# Patient Record
Sex: FEMALE | Race: WHITE | Employment: STUDENT | URBAN - METROPOLITAN AREA
[De-identification: names, ages, dates, MRNs, and addresses within clinical notes are randomized per-mention and may not be internally consistent; named-entity substitution may affect disease eponyms.]

---

## 2022-05-22 ENCOUNTER — OFFICE VISIT (OUTPATIENT)
Dept: URGENT CARE | Facility: URGENT CARE | Age: 22
End: 2022-05-22
Payer: COMMERCIAL

## 2022-05-22 VITALS
DIASTOLIC BLOOD PRESSURE: 78 MMHG | TEMPERATURE: 98.3 F | SYSTOLIC BLOOD PRESSURE: 114 MMHG | OXYGEN SATURATION: 98 % | WEIGHT: 147 LBS | HEART RATE: 108 BPM

## 2022-05-22 DIAGNOSIS — R19.7 DIARRHEA, UNSPECIFIED TYPE: Primary | ICD-10-CM

## 2022-05-22 PROCEDURE — 99203 OFFICE O/P NEW LOW 30 MIN: CPT | Performed by: FAMILY MEDICINE

## 2022-05-22 PROCEDURE — 87493 C DIFF AMPLIFIED PROBE: CPT | Performed by: FAMILY MEDICINE

## 2022-05-22 PROCEDURE — 87209 SMEAR COMPLEX STAIN: CPT | Performed by: FAMILY MEDICINE

## 2022-05-22 PROCEDURE — 87177 OVA AND PARASITES SMEARS: CPT | Performed by: FAMILY MEDICINE

## 2022-05-22 RX ORDER — ESCITALOPRAM OXALATE 5 MG/1
1 TABLET ORAL
COMMUNITY
Start: 2021-09-17

## 2022-05-22 RX ORDER — ESCITALOPRAM OXALATE 10 MG/1
1 TABLET ORAL DAILY
COMMUNITY
Start: 2020-06-10

## 2022-05-22 RX ORDER — LEVONORGESTREL/ETHIN.ESTRADIOL 0.1-0.02MG
1 TABLET ORAL DAILY
COMMUNITY
Start: 2021-08-25

## 2022-05-22 NOTE — PATIENT INSTRUCTIONS
Stay well hydrated    Diet as tolerated    Return the stool tests    Be seen promptly if symptoms acutely worsen

## 2022-05-22 NOTE — PROGRESS NOTES
Rae Verdin is a 21 year old female who comes in today for diarrhea for a week      Staying same    abd pain    Watery stool     No blood    No unusual food / drink    Had cephalexin for strep, finished 5 days ago    Was on antibiotics when diarrhea started    No fever/ chills    Urinating okay    Lots of fluids    No contacts with this     No change in meds    Started probiotic a couple days ago      No travel/ camping    No vomiting    No vaginal problems         ROS    Physical Exam  Constitutional:       Appearance: Normal appearance.   HENT:      Head: Atraumatic.   Eyes:      Conjunctiva/sclera: Conjunctivae normal.   Cardiovascular:      Rate and Rhythm: Normal rate and regular rhythm.      Heart sounds: Normal heart sounds.   Pulmonary:      Effort: Pulmonary effort is normal. No respiratory distress.      Breath sounds: Normal breath sounds.   Abdominal:      Tenderness: There is no right CVA tenderness, left CVA tenderness, guarding or rebound.   Neurological:      Mental Status: She is alert.     mild subjective discomfor in lower abd  No pinpoint tenderness      ASSESSMENT / PLAN:  (R19.7) Diarrhea, unspecified type  (primary encounter diagnosis)  Comment: prudent to do stool tests.  Avoid imodium type meds if possible.  Stay well  Hydrated  Do stool tests.  Be seen promptly if symptoms acutely worsen.     Plan: Enteric Bacteria and Virus Panel by TANYA Stool,         Ova and Parasite Exam Routine, Clostridium         difficile Toxin B PCR               I reviewed the patient's medications, allergies, medical history, family history, and social history.    Og Palacios MD

## 2022-05-24 LAB — C DIFF TOX B STL QL: POSITIVE

## 2022-05-25 ENCOUNTER — TELEPHONE (OUTPATIENT)
Dept: URGENT CARE | Facility: URGENT CARE | Age: 22
End: 2022-05-25
Payer: COMMERCIAL

## 2022-05-25 DIAGNOSIS — A49.8 CLOSTRIDIOIDES DIFFICILE INFECTION: Primary | ICD-10-CM

## 2022-05-25 LAB
O+P STL MICRO: NEGATIVE
TRI STN SPEC: NORMAL

## 2022-05-25 RX ORDER — METRONIDAZOLE 500 MG/1
500 TABLET ORAL 3 TIMES DAILY
Qty: 30 TABLET | Refills: 0 | Status: SHIPPED | OUTPATIENT
Start: 2022-05-25 | End: 2022-06-04

## 2022-05-25 NOTE — TELEPHONE ENCOUNTER
Called patient to discuss stool culture results.   No abdominal pain or vomiting. She has not had fever. She has never had C diff in the past.   Will start her on Flagyl three times per day X 10 days  Discussed good hand hygiene, cleaning toilet and bathroom with bleach water, washing clothes on high heat etc.   Follow-up with PCP in 2 days. Discussed indications to follow-up in ER.    Princess Pablo PA-C

## 2024-04-01 ENCOUNTER — LAB REQUISITION (OUTPATIENT)
Dept: LAB | Facility: CLINIC | Age: 24
End: 2024-04-01
Payer: COMMERCIAL

## 2024-04-01 DIAGNOSIS — Z11.3 ENCOUNTER FOR SCREENING FOR INFECTIONS WITH A PREDOMINANTLY SEXUAL MODE OF TRANSMISSION: ICD-10-CM

## 2024-04-01 DIAGNOSIS — Z01.419 ENCOUNTER FOR GYNECOLOGICAL EXAMINATION (GENERAL) (ROUTINE) WITHOUT ABNORMAL FINDINGS: ICD-10-CM

## 2024-04-01 PROCEDURE — 87491 CHLMYD TRACH DNA AMP PROBE: CPT | Mod: ORL | Performed by: OBSTETRICS & GYNECOLOGY

## 2024-04-01 PROCEDURE — G0145 SCR C/V CYTO,THINLAYER,RESCR: HCPCS | Mod: ORL | Performed by: OBSTETRICS & GYNECOLOGY

## 2024-04-01 PROCEDURE — 87491 CHLMYD TRACH DNA AMP PROBE: CPT | Performed by: OBSTETRICS & GYNECOLOGY

## 2024-04-01 PROCEDURE — G0145 SCR C/V CYTO,THINLAYER,RESCR: HCPCS | Performed by: OBSTETRICS & GYNECOLOGY

## 2024-04-02 LAB
C TRACH DNA SPEC QL PROBE+SIG AMP: NEGATIVE
N GONORRHOEA DNA SPEC QL NAA+PROBE: NEGATIVE

## 2024-04-03 LAB
BKR LAB AP GYN ADEQUACY: NORMAL
BKR LAB AP GYN INTERPRETATION: NORMAL
BKR LAB AP HPV REFLEX: NORMAL
BKR LAB AP LMP: NORMAL
BKR LAB AP PREVIOUS ABNL DX: NORMAL
BKR LAB AP PREVIOUS ABNORMAL: NORMAL
PATH REPORT.COMMENTS IMP SPEC: NORMAL
PATH REPORT.COMMENTS IMP SPEC: NORMAL
PATH REPORT.RELEVANT HX SPEC: NORMAL

## 2024-06-02 ENCOUNTER — OFFICE VISIT (OUTPATIENT)
Dept: URGENT CARE | Facility: URGENT CARE | Age: 24
End: 2024-06-02
Payer: COMMERCIAL

## 2024-06-02 VITALS
DIASTOLIC BLOOD PRESSURE: 74 MMHG | OXYGEN SATURATION: 96 % | TEMPERATURE: 98.1 F | WEIGHT: 172 LBS | HEART RATE: 82 BPM | RESPIRATION RATE: 14 BRPM | SYSTOLIC BLOOD PRESSURE: 126 MMHG

## 2024-06-02 DIAGNOSIS — L71.0 PERIORAL DERMATITIS: Primary | ICD-10-CM

## 2024-06-02 PROCEDURE — 99213 OFFICE O/P EST LOW 20 MIN: CPT | Performed by: FAMILY MEDICINE

## 2024-06-02 RX ORDER — METRONIDAZOLE 7.5 MG/G
GEL TOPICAL 2 TIMES DAILY
Qty: 45 G | Refills: 0 | Status: SHIPPED | OUTPATIENT
Start: 2024-06-02

## 2024-06-02 NOTE — PATIENT INSTRUCTIONS
# Perioral dermatitis: Symptoms noted over the past 4 weeks in the setting of swimming.  On examination she does have erythematous papules over the inferior portion of the perioral region.  Counseled patient on likely diagnosis and treatment options including topical antibiotics.  Will also place a referral to dermatology.  Advised patient to avoid all topical  steroid medications  Testing Findings: None  Treatment: Topical erythromycin, dermatology referral  Medications: Topical metronidazole ordered  Follow-up: 4 to 8 weeks with dermatology if not improving    If you have not yet received the influenza vaccine but would like to get one, please call  [phone] or you can schedule via [employer]    It was great seeing you today!    Sammy Terry MD, [facility]

## 2024-06-02 NOTE — PROGRESS NOTES
Assessment & Plan     Perioral dermatitis     - Adult Dermatology [facility] Referral  - metroNIDAZOLE (METROGEL) 0.75 % external gel  Dispense: 45 g; Refill: 0        # Perioral dermatitis: Symptoms noted over the past 4 weeks in the setting of swimming.  On examination she does have erythematous papules over the inferior portion of the perioral region.  Counseled patient on likely diagnosis and treatment options including topical antibiotics.  Will also place a referral to dermatology.  Advised patient to avoid all topical  steroid medications  Testing Findings: None  Treatment: Topical erythromycin, dermatology referral  Medications: Topical erythromycin ordered  Follow-up: 4 to 8 weeks with dermatology if not improving    Return in about 4 weeks (around 6/30/2024), or if symptoms worsen or fail to improve.    Sammy Terry MD  [facility] DHAVAL Mcbride is a 23 year old female who presents to clinic today for the following health issues:  Chief Complaint   Patient presents with    Urgent Care    Mouth/Lip Problem     Rash around mouth/chin x 3-4 week  Scabby itchy bumps dry patches corner of mouth x 2 days   Tried Cicaplast ointment sea buckthorn helped a little to smooth it out and became less inflamed     HPI     Rash    Onset of rash was 4 week(s) ago. Perioral rash after started swimming. No history of allergies. Tried using an oil   Course of illness is worsening.  Severity moderate  Current and Associated symptoms: itching and red   Location of the rash: perioral region.  Previous history of a similar rash? No  Recent exposure history: none known  Denies exposure to: none known  Associated symptoms include: nothing.  Treatment measures tried include: Cicaplast ointment       Review of Systems  Constitutional, HEENT, cardiovascular, pulmonary, gi and gu systems are negative, except as otherwise noted.      Objective    /74 (BP Location: Right arm, Patient Position:  Sitting, Cuff Size: Adult Regular)   Pulse 82   Temp 98.1  F (36.7  C) (Tympanic)   Resp 14   Wt 78 kg (172 lb)   LMP 05/21/2024 (Exact Date)   SpO2 96%   Breastfeeding No   Physical Exam   GENERAL: alert and no distress  SKIN: Mildly erythematous papules over the inferior periorbital region    None

## 2025-07-17 ENCOUNTER — TRANSCRIBE ORDERS (OUTPATIENT)
Dept: OTHER | Age: 25
End: 2025-07-17

## 2025-07-17 DIAGNOSIS — R10.2 PELVIC AND PERINEAL PAIN: Primary | ICD-10-CM
